# Patient Record
Sex: FEMALE | Race: WHITE | NOT HISPANIC OR LATINO | Employment: OTHER | URBAN - NONMETROPOLITAN AREA
[De-identification: names, ages, dates, MRNs, and addresses within clinical notes are randomized per-mention and may not be internally consistent; named-entity substitution may affect disease eponyms.]

---

## 2019-03-05 ENCOUNTER — TRANSFERRED RECORDS (OUTPATIENT)
Dept: HEALTH INFORMATION MANAGEMENT | Facility: OTHER | Age: 79
End: 2019-03-05

## 2019-04-11 ENCOUNTER — TRANSFERRED RECORDS (OUTPATIENT)
Dept: HEALTH INFORMATION MANAGEMENT | Facility: OTHER | Age: 79
End: 2019-04-11

## 2020-03-05 ENCOUNTER — TRANSFERRED RECORDS (OUTPATIENT)
Dept: HEALTH INFORMATION MANAGEMENT | Facility: OTHER | Age: 80
End: 2020-03-05

## 2020-03-20 ENCOUNTER — TRANSFERRED RECORDS (OUTPATIENT)
Dept: HEALTH INFORMATION MANAGEMENT | Facility: OTHER | Age: 80
End: 2020-03-20

## 2020-04-02 ENCOUNTER — TRANSFERRED RECORDS (OUTPATIENT)
Dept: HEALTH INFORMATION MANAGEMENT | Facility: OTHER | Age: 80
End: 2020-04-02

## 2020-06-03 ENCOUNTER — OFFICE VISIT (OUTPATIENT)
Dept: FAMILY MEDICINE | Facility: OTHER | Age: 80
End: 2020-06-03
Attending: FAMILY MEDICINE
Payer: COMMERCIAL

## 2020-06-03 VITALS
SYSTOLIC BLOOD PRESSURE: 136 MMHG | BODY MASS INDEX: 24.91 KG/M2 | HEART RATE: 68 BPM | OXYGEN SATURATION: 97 % | DIASTOLIC BLOOD PRESSURE: 82 MMHG | HEIGHT: 66 IN | WEIGHT: 155 LBS | TEMPERATURE: 96.7 F

## 2020-06-03 DIAGNOSIS — N63.10 BREAST MASS, RIGHT: Primary | ICD-10-CM

## 2020-06-03 DIAGNOSIS — N60.19 FIBROCYSTIC BREAST CHANGES, UNSPECIFIED LATERALITY: ICD-10-CM

## 2020-06-03 DIAGNOSIS — E78.2 MIXED HYPERLIPIDEMIA: ICD-10-CM

## 2020-06-03 PROCEDURE — 99203 OFFICE O/P NEW LOW 30 MIN: CPT | Performed by: FAMILY MEDICINE

## 2020-06-03 PROCEDURE — G0463 HOSPITAL OUTPT CLINIC VISIT: HCPCS

## 2020-06-03 RX ORDER — METOPROLOL SUCCINATE 25 MG/1
25 TABLET, EXTENDED RELEASE ORAL DAILY
COMMUNITY
End: 2020-06-03

## 2020-06-03 RX ORDER — ACETAMINOPHEN 500 MG
500-1000 TABLET ORAL EVERY 6 HOURS PRN
COMMUNITY

## 2020-06-03 RX ORDER — ROSUVASTATIN CALCIUM 10 MG/1
10 TABLET, COATED ORAL
COMMUNITY
Start: 2019-07-29

## 2020-06-03 RX ORDER — METOPROLOL TARTRATE 25 MG/1
25 TABLET, FILM COATED ORAL DAILY
COMMUNITY

## 2020-06-03 RX ORDER — IBUPROFEN 800 MG/1
800 TABLET, FILM COATED ORAL
COMMUNITY
Start: 2019-07-29

## 2020-06-03 RX ORDER — MULTIVIT,IRON,MINERALS/LUTEIN
TABLET ORAL DAILY
COMMUNITY

## 2020-06-03 ASSESSMENT — PAIN SCALES - GENERAL: PAINLEVEL: NO PAIN (0)

## 2020-06-03 ASSESSMENT — MIFFLIN-ST. JEOR: SCORE: 1195.86

## 2020-06-03 NOTE — PROGRESS NOTES
"Nursing Notes:   Sahlini Yee LPN  6/3/2020  1:08 PM  Signed  Chief Complaint   Patient presents with     Mass     right breast lump noticed March 5 th   She saw a Dr in [place] and she felt a lump in her right breast. She is here to get it looked at.  Shalini Yee LPN..................6/3/2020   1:08 PM      Initial /82   Pulse 68   Temp 96.7  F (35.9  C) (Temporal)   Ht 1.67 m (5' 5.75\")   Wt 70.3 kg (155 lb)   SpO2 97%   Breastfeeding No   BMI 25.21 kg/m   Estimated body mass index is 25.21 kg/m  as calculated from the following:    Height as of this encounter: 1.67 m (5' 5.75\").    Weight as of this encounter: 70.3 kg (155 lb).  Medication Reconciliation: complete    Shalini Yee LPN      SUBJECTIVE:  Louise Mauricio  is a 80 year old female who comes in today because of a lump in her right breast.  This was apparently detected by physician (Dr. Garcia at [facility] in [place]) in [place] on March 5.  She had some tenderness. She had a mammogram and ultrasound. It was recommended she see a specialist. She doesn't feel it has changed in the last 3 months.     Her  was diagnosed with pancreatic cancer, had a Whipple and started chemotherapy.  He is at infusion now.  He is having a rough time with chemotherapy.  He is having markers drawn and will have a PET CT done soon. He is seeing Dr. Chand.    She is never been here before.  They have a place in [place]. They previously lived there, but now have a new place.     They began living in [place], [place] in 2003.     She is on Prilosec for reflux. She is on Crestor for elevated cholesterol.  She had a cardiac nuclear medicine test with cardiology at age 77. She passed the test. She has a strong family history of heart disease and she is the only one who hasn't had any.  She is on low dose Metoprolol 25 mg once daily.         Past Medical, Family, and Social History reviewed and updated as noted below.   ROS is negative " "except as noted above       Allergies not on file, History reviewed. No pertinent family history.,   Current Outpatient Medications   Medication     acetaminophen (TYLENOL) 500 MG tablet     Aspirin-Caffeine (MARGARITA BACK & BODY PO)     ibuprofen (ADVIL/MOTRIN) 800 MG tablet     metoprolol tartrate (LOPRESSOR) 25 MG tablet     Multiple Minerals-Vitamins (CALCIUM-MAGNESIUM-ZINC-D3) TABS     omeprazole (PRILOSEC) 20 MG DR capsule     rosuvastatin (CRESTOR) 10 MG tablet     No current facility-administered medications for this visit.    , History reviewed. No pertinent past medical history., There are no active problems to display for this patient.  ,   Past Surgical History:   Procedure Laterality Date     AS REPAIR OF JAQUIE,ONE  1987     TONSILLECTOMY  age 9    and   Social History     Tobacco Use     Smoking status: Never Smoker     Smokeless tobacco: Never Used   Substance Use Topics     Alcohol use: Yes     Comment: 1-2 drinks a day     OBJECTIVE:  /82   Pulse 68   Temp 96.7  F (35.9  C) (Temporal)   Ht 1.67 m (5' 5.75\")   Wt 70.3 kg (155 lb)   SpO2 97%   Breastfeeding No   BMI 25.21 kg/m     EXAM:  General Appearance: Pleasant, alert, appropriate appearance for age. No acute distress  Head Exam: Normal. Normocephalic, atraumatic.  Eye Exam: PERRLA, EOMI, conjunctivae, sclerae normal.  Nose Exam: Normal external nose, mucus membranes, and septum.  OroPharynx Exam:  Dental hygiene adequate. Normal buccal mucosa. Normal pharynx.  Neck Exam:  Supple, no masses or nodes. No bruits  Thyroid Exam: No nodules or enlargement.  Chest/Respiratory Exam: Normal chest wall and respirations. Clear to auscultation.  Breast Exam: No dimpling, nipple retraction or discharge. No worrisome masses or nodes.  Upper outer quadrant of the right breast has a fullness and what feels like fibrous tissue is maybe 2 x 2 cm that slightly tender.  Cardiovascular Exam: Regular rate and rhythm. S1, S2, no murmur, click, gallop, or " rubs.  Gastrointestinal Exam: Soft, non-tender, no masses or organomegaly..  Lymphatic Exam: Non-palpable nodes in neck,clavicular, axillary, or inguinal regions.   Musculoskeletal Exam: Back is straight and non-tender, full ROM of upper and lower extremities.  Foot Exam: Left and right foot: good pedal pulses  Skin: no rash or abnormalities  Neurologic Exam:  normal gross motor, tone coordination and no tremor.  Psychiatric Exam: Alert and oriented - appropriate affect.   ASSESSMENT/Plan :    Louise was seen today for mass.    Diagnoses and all orders for this visit:    Breast mass, right  -     BREAST CENTER REFERRAL    Fibrocystic breast changes, unspecified laterality  -     BREAST CENTER REFERRAL    Mixed hyperlipidemia      Will review her outside records.  Referral made to the breast center.  Patient was a bit reluctant to go but would agree to it after I review the outside records.  She has fibrocystic breast tissue and I suspect this is probably more fibrocystic lump than anything worrisome but certainly feel we need to follow through with review and possible ongoing follow-up as appropriate.    A total of 35 minutes was spent with the patient, greater than 50% of the time was spent in counseling/discussion of the aforementioned concerns.     Maninedr Lainez MD

## 2020-06-03 NOTE — NURSING NOTE
"Chief Complaint   Patient presents with     Mass     right breast lump noticed March 5 th   She saw a Dr in [place] and she felt a lump in her right breast. She is here to get it looked at.  Shalini Yee LPN..................6/3/2020   1:08 PM      Initial /82   Pulse 68   Temp 96.7  F (35.9  C) (Temporal)   Ht 1.67 m (5' 5.75\")   Wt 70.3 kg (155 lb)   SpO2 97%   Breastfeeding No   BMI 25.21 kg/m   Estimated body mass index is 25.21 kg/m  as calculated from the following:    Height as of this encounter: 1.67 m (5' 5.75\").    Weight as of this encounter: 70.3 kg (155 lb).  Medication Reconciliation: complete    Shalini Yee LPN  "

## 2020-06-10 ENCOUNTER — OFFICE VISIT (OUTPATIENT)
Dept: SURGERY | Facility: OTHER | Age: 80
End: 2020-06-10
Attending: SURGERY
Payer: COMMERCIAL

## 2020-06-10 VITALS
BODY MASS INDEX: 25.23 KG/M2 | SYSTOLIC BLOOD PRESSURE: 120 MMHG | DIASTOLIC BLOOD PRESSURE: 60 MMHG | WEIGHT: 157 LBS | TEMPERATURE: 98.8 F | HEIGHT: 66 IN | RESPIRATION RATE: 16 BRPM | HEART RATE: 66 BPM

## 2020-06-10 DIAGNOSIS — M25.511 CHRONIC RIGHT SHOULDER PAIN: ICD-10-CM

## 2020-06-10 DIAGNOSIS — G89.29 CHRONIC RIGHT SHOULDER PAIN: ICD-10-CM

## 2020-06-10 DIAGNOSIS — R07.89 RIGHT-SIDED CHEST WALL PAIN: Primary | ICD-10-CM

## 2020-06-10 PROCEDURE — 99203 OFFICE O/P NEW LOW 30 MIN: CPT | Performed by: SURGERY

## 2020-06-10 PROCEDURE — G0463 HOSPITAL OUTPT CLINIC VISIT: HCPCS

## 2020-06-10 ASSESSMENT — PAIN SCALES - GENERAL: PAINLEVEL: NO PAIN (0)

## 2020-06-10 ASSESSMENT — MIFFLIN-ST. JEOR: SCORE: 1204.93

## 2020-06-10 NOTE — PROGRESS NOTES
OFFICE CONSULTATION NOTE  Patient Name: Louise Mauricio  Address: [address] [address]  Age:80 year old  Sex: female     Primary Care Physician: Physician No Ref-Primary    I was requested to see this patient in consultation by Dr. Lainez for evaluation of right breast pain and lump. A copy of this note will be sent to Dr. Lainez.    HPI:   The patient is 80 year old female with tenderness in her right breast on the side. She has had this for months. She originally noted it in [place] this winter. She had her yearly exam and her provider noted some thickening of the right outer breast in the area of tenderness. US and mammogram were reportedly normal. The pain is tenderness with palpation. The patient hasn't felt a lump at all. She has no nipple changes or nipple drainage bilaterally. She has not noted any new skin lesions on the breasts. She hasn't noted any new lumps, bumps or tenderness of the left breast.   She notes that she was seeing physical therapy for right shoulder pain and right arm pain previously. She noted that the therapy helped. She didn't have any therapy over the winter in [place]. She has been really busy with caring for her  has he was diagnosed with pancreatic cancer and had surgery in January, he continues with chemotherapy here.   No family history of breast cancer.   The patient hasn't had biopsy performed.       CONSULTATION ASSESSMENT AND PLAN/RECOMMENDATIONS:   I discussed with the patient the pathophysiology of breast pain and breast disease. I suspect this pain is related to chest wall muscles and not breast tissue. We specifically discussed that most pain is not related to breast cancer. I ordered physical therapy referral. I will review the mammogram and US images from [place] when they are available. I will call the patient with that information. If no concerns with the images, patient to return to annual screening mammograms. The patient's questions were answered.The  patient expressed understanding. She will call with questions or concerns..    REVIEW OF SYSTEMS  GENERAL: No fevers or chills. Denies fatigue, recent weight loss. Has some increased stress dealing with her husbands illness.  HEENT: No sinus drainage. No changes with vision or hearing. No difficulty swallowing.   LYMPHATICS:  No swollen nodes in axilla, neck or groin.  CARDIOVASCULAR: Denies chest pain, palpitations and dyspnea on exertion.  PULMONARY: No shortness of breath or cough. No increase in sputum production.  GI: Denies melena, bright red blood in stools. No hematemesis. No constipation or diarrhea.  : No dysuria or hematuria.  SKIN: No recent rashes or ulcers.   HEMATOLOGY:  No history of easy bruising or bleeding.  ENDOCRINE:  No history of diabetes or thyroid problems.  NEUROLOGY:  No history of seizures or headaches. No motor or sensory changes.  BREAST: as above    No past medical history on file.   Past Surgical History:   Procedure Laterality Date     AS REPAIR OF HAMMERTOE,ONE  1987     TONSILLECTOMY  age 9     Current Outpatient Medications   Medication     acetaminophen (TYLENOL) 500 MG tablet     Aspirin-Caffeine (MARGARITA BACK & BODY PO)     ibuprofen (ADVIL/MOTRIN) 800 MG tablet     metoprolol tartrate (LOPRESSOR) 25 MG tablet     Multiple Minerals-Vitamins (CALCIUM-MAGNESIUM-ZINC-D3) TABS     omeprazole (PRILOSEC) 20 MG DR capsule     rosuvastatin (CRESTOR) 10 MG tablet     No current facility-administered medications for this visit.      No Known Allergies  No family history on file.  Social History     Socioeconomic History     Marital status:      Spouse name: None     Number of children: None     Years of education: None     Highest education level: None   Occupational History     None   Social Needs     Financial resource strain: None     Food insecurity     Worry: None     Inability: None     Transportation needs     Medical: None     Non-medical: None   Tobacco Use     Smoking  "status: Never Smoker     Smokeless tobacco: Never Used   Substance and Sexual Activity     Alcohol use: Yes     Comment: 1-2 drinks a day     Drug use: Never     Sexual activity: Not Currently     Partners: Male   Lifestyle     Physical activity     Days per week: None     Minutes per session: None     Stress: None   Relationships     Social connections     Talks on phone: None     Gets together: None     Attends [employer] service: None     Active member of club or organization: None     Attends meetings of clubs or organizations: None     Relationship status: None     Intimate partner violence     Fear of current or ex partner: None     Emotionally abused: None     Physically abused: None     Forced sexual activity: None   Other Topics Concern     None   Social History Narrative    Grew up in [place].  Her  is from [place].        They have been  16 years.  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         In , they bought land [place] [facility] and built a cabin [place]. Her older son now lives there.        Her son Alex  at age 20 with renal carcinoma.      The above history was reviewed and updated today, 6/10/2020    PHYSICAL EXAM  Vitals: /60 (BP Location: Right arm, Patient Position: Sitting, Cuff Size: Adult Regular)   Pulse 66   Temp 98.8  F (37.1  C) (Tympanic)   Resp 16   Ht 1.67 m (5' 5.75\")   Wt 71.2 kg (157 lb)   BMI 25.53 kg/m    GENERAL: Healthy appearing patient in no acute distress. Pleasant and cooperative with exam and interview.   HEENT: Head-normocephalic. Eyes-no scleral icterus, pupils equal, round, andreactive to light. Nose-no nasal drainage. No lesions. Mouth-oral mucosa pink and moist, no lesions.  NECK: Supple. No thyroid nodules. Trachea midline.  LYMPHATICS:  No cervical, axillary or " supraclavicularadenopathy.  CV: Regular rate and rhythm, no murmurs. No peripheral edema.  LUNGS:  No respiratory distress. Clear bilaterally to auscultation.  SKIN: Pink, warm and dry. No jaundice. No rash.  NEURO:  Cranial nerves II-XII grossly intact. Alert and oriented.  PSYCH: Appropriate mood and affect.  BREAST: Breasts were examined in the seated and supine position. No mass noted bilaterally. No nipple changes or discharge bilaterally. Moderate tenderness noted with palpation of the chest wall musculature lateral to the breast tissue.     IMAGING/LAB  Awaiting mammogram and US images. Reports reviewed.

## 2020-06-10 NOTE — NURSING NOTE
"Chief Complaint   Patient presents with     Consult     right breast       Initial /60 (BP Location: Right arm, Patient Position: Sitting, Cuff Size: Adult Regular)   Pulse 66   Temp 98.8  F (37.1  C) (Tympanic)   Resp 16   Ht 1.67 m (5' 5.75\")   Wt 71.2 kg (157 lb)   BMI 25.53 kg/m   Estimated body mass index is 25.53 kg/m  as calculated from the following:    Height as of this encounter: 1.67 m (5' 5.75\").    Weight as of this encounter: 71.2 kg (157 lb).  Medication Reconciliation: complete    At what age did you start menopause? 52  How many children do you have? 3  What age did your menstrual cycle start? 13  How old were you when your first child was born? 21  Did you breast feed? no  Are you on or have you ever taken any hormone replacement or birth control? Yes, both  Do you have a family history of breast cancer? no  Rosa Art LPN..........6/10/2020  12:54 PM        "

## 2020-06-10 NOTE — PATIENT INSTRUCTIONS
Right chest wall pain that I suspect is musculoskeletal in nature. Will discuss with Dr. Lainez. Physical therapy referral ordered for [facility] in [place] per your request. I will call you after we get the images from [place] and I look at them.

## 2020-06-30 ENCOUNTER — TRANSFERRED RECORDS (OUTPATIENT)
Dept: HEALTH INFORMATION MANAGEMENT | Facility: OTHER | Age: 80
End: 2020-06-30

## 2020-07-16 ENCOUNTER — TELEPHONE (OUTPATIENT)
Dept: SURGERY | Facility: OTHER | Age: 80
End: 2020-07-16

## 2020-07-16 NOTE — TELEPHONE ENCOUNTER
Called patient. She is working with physical therapy and the pain is much improved. We received her mammogram images and I reviewed those with the radiologist-no concerning findings. Patient will let up know if she has any further concerns. She denies questions at this time.

## 2020-07-30 ENCOUNTER — OFFICE VISIT (OUTPATIENT)
Dept: FAMILY MEDICINE | Facility: OTHER | Age: 80
End: 2020-07-30
Attending: FAMILY MEDICINE
Payer: COMMERCIAL

## 2020-07-30 VITALS
DIASTOLIC BLOOD PRESSURE: 64 MMHG | SYSTOLIC BLOOD PRESSURE: 120 MMHG | TEMPERATURE: 98.6 F | WEIGHT: 154 LBS | OXYGEN SATURATION: 99 % | HEART RATE: 72 BPM | RESPIRATION RATE: 16 BRPM | BODY MASS INDEX: 25.05 KG/M2

## 2020-07-30 DIAGNOSIS — M51.369 DDD (DEGENERATIVE DISC DISEASE), LUMBAR: Primary | ICD-10-CM

## 2020-07-30 DIAGNOSIS — R35.0 URINARY FREQUENCY: ICD-10-CM

## 2020-07-30 LAB
ALBUMIN UR-MCNC: NEGATIVE MG/DL
APPEARANCE UR: CLEAR
BILIRUB UR QL STRIP: NEGATIVE
COLOR UR AUTO: NORMAL
GLUCOSE UR STRIP-MCNC: NEGATIVE MG/DL
HGB UR QL STRIP: NEGATIVE
KETONES UR STRIP-MCNC: NEGATIVE MG/DL
LEUKOCYTE ESTERASE UR QL STRIP: NEGATIVE
NITRATE UR QL: NEGATIVE
PH UR STRIP: 5 PH (ref 5–7)
SOURCE: NORMAL
SP GR UR STRIP: 1.01 (ref 1–1.03)
UROBILINOGEN UR STRIP-MCNC: NORMAL MG/DL (ref 0–2)

## 2020-07-30 PROCEDURE — 81003 URINALYSIS AUTO W/O SCOPE: CPT | Mod: ZL | Performed by: FAMILY MEDICINE

## 2020-07-30 PROCEDURE — G0463 HOSPITAL OUTPT CLINIC VISIT: HCPCS

## 2020-07-30 PROCEDURE — 99213 OFFICE O/P EST LOW 20 MIN: CPT | Performed by: FAMILY MEDICINE

## 2020-07-30 ASSESSMENT — ENCOUNTER SYMPTOMS
FLANK PAIN: 0
FEVER: 0
FATIGUE: 1
BACK PAIN: 1
DYSURIA: 0
WEAKNESS: 0

## 2020-07-30 ASSESSMENT — PATIENT HEALTH QUESTIONNAIRE - PHQ9: 5. POOR APPETITE OR OVEREATING: NOT AT ALL

## 2020-07-30 ASSESSMENT — ANXIETY QUESTIONNAIRES
5. BEING SO RESTLESS THAT IT IS HARD TO SIT STILL: NOT AT ALL
6. BECOMING EASILY ANNOYED OR IRRITABLE: NOT AT ALL
GAD7 TOTAL SCORE: 0
1. FEELING NERVOUS, ANXIOUS, OR ON EDGE: NOT AT ALL
IF YOU CHECKED OFF ANY PROBLEMS ON THIS QUESTIONNAIRE, HOW DIFFICULT HAVE THESE PROBLEMS MADE IT FOR YOU TO DO YOUR WORK, TAKE CARE OF THINGS AT HOME, OR GET ALONG WITH OTHER PEOPLE: NOT DIFFICULT AT ALL
7. FEELING AFRAID AS IF SOMETHING AWFUL MIGHT HAPPEN: NOT AT ALL
3. WORRYING TOO MUCH ABOUT DIFFERENT THINGS: NOT AT ALL
2. NOT BEING ABLE TO STOP OR CONTROL WORRYING: NOT AT ALL

## 2020-07-30 NOTE — NURSING NOTE
"Coming in with frequency, back pain that started this week- UTI    Chief Complaint   Patient presents with     UTI     back pain, frequency, this week       Initial /64   Pulse 72   Temp 98.6  F (37  C)   Resp 16   Wt 69.9 kg (154 lb)   SpO2 99%   BMI 25.05 kg/m   Estimated body mass index is 25.05 kg/m  as calculated from the following:    Height as of 6/10/20: 1.67 m (5' 5.75\").    Weight as of this encounter: 69.9 kg (154 lb).  Medication Reconciliation: complete    Lorie Damian LPN  "

## 2020-07-30 NOTE — PROGRESS NOTES
SUBJECTIVE:   Louise Mauricio is a 80 year old female who presents to clinic today for the following health issues:    HPI  Worried about a kidney infection.  Back pain is moderate, and worried overall, caring for  who has pancreatic cancer.  Bandlike pain at /3 or so, bilateral.  Noting urgency and frequency.  Some urine leaking only with lifting heavy objects.  Severe back pain in the past, for the last 2 years it has been much more tolerable.  Known stenosis.  L2/3, last RI was 1-2 years ago.    Patient Active Problem List    Diagnosis Date Noted     Mixed hyperlipidemia 06/03/2020     Priority: Medium     Fibrocystic breast changes, unspecified laterality 06/03/2020     Priority: Medium     Closed fracture of metatarsal bone 04/12/2007     Priority: Medium     Past Surgical History:   Procedure Laterality Date     AS REPAIR OF GIULIANOMYLESQIAN,ONE  1987     TONSILLECTOMY  age 9     Social History     Tobacco Use     Smoking status: Never Smoker     Smokeless tobacco: Never Used   Substance Use Topics     Alcohol use: Yes     Comment: 1-2 drinks a day     Current Outpatient Medications   Medication Sig Dispense Refill     acetaminophen (TYLENOL) 500 MG tablet Take 500-1,000 mg by mouth every 6 hours as needed for mild pain       Aspirin-Caffeine (MARGARITA BACK & BODY PO) Take 1,000 mg by mouth daily       ibuprofen (ADVIL/MOTRIN) 800 MG tablet Take 800 mg by mouth       metoprolol tartrate (LOPRESSOR) 25 MG tablet Take 25 mg by mouth daily       Multiple Minerals-Vitamins (CALCIUM-MAGNESIUM-ZINC-D3) TABS Take by mouth daily       omeprazole (PRILOSEC) 20 MG DR capsule Take 20 mg by mouth       rosuvastatin (CRESTOR) 10 MG tablet Take 10 mg by mouth       No Known Allergies    Review of Systems   Constitutional: Positive for fatigue. Negative for fever.   Genitourinary: Negative for decreased urine volume, dysuria and flank pain.   Musculoskeletal: Positive for back pain.   Neurological: Negative for weakness.         OBJECTIVE:     /64   Pulse 72   Temp 98.6  F (37  C)   Resp 16   Wt 69.9 kg (154 lb)   SpO2 99%   BMI 25.05 kg/m    Body mass index is 25.05 kg/m .  Physical Exam  Constitutional:       Appearance: Normal appearance.   Musculoskeletal:      Comments: No flank pain on percussion,  No midline lumbar pain and negative straight leg raise.    Neurological:      General: No focal deficit present.      Mental Status: She is alert and oriented to person, place, and time.   Psychiatric:         Mood and Affect: Mood normal.         Behavior: Behavior normal.         Thought Content: Thought content normal.         Diagnostic Test Results:  Results for orders placed or performed in visit on 07/30/20 (from the past 24 hour(s))   UA reflex to Microscopic   Result Value Ref Range    Color Urine Light Yellow     Appearance Urine Clear     Glucose Urine Negative NEG^Negative mg/dL    Bilirubin Urine Negative NEG^Negative    Ketones Urine Negative NEG^Negative mg/dL    Specific Gravity Urine 1.012 1.003 - 1.035    Blood Urine Negative NEG^Negative    pH Urine 5.0 5.0 - 7.0 pH    Protein Albumin Urine Negative NEG^Negative mg/dL    Urobilinogen mg/dL Normal 0.0 - 2.0 mg/dL    Nitrite Urine Negative NEG^Negative    Leukocyte Esterase Urine Negative NEG^Negative    Source Midstream Urine        ASSESSMENT/PLAN:         (M51.36) DDD (degenerative disc disease), lumbar  (primary encounter diagnosis)  Comment: stable, I offered an x-ray to rule out compression fracture or cancer and she declines  Plan: follow up as needed     (R35.0) Urinary frequency  Comment: using some vitamin supplements recently and perhaps this is playing a role.  Doubt a UTI with a normal UA.    Plan: UA reflex to Microscopic                 Milo Mcmahan MD  [facility] AND [facility]

## 2020-07-31 ASSESSMENT — ANXIETY QUESTIONNAIRES: GAD7 TOTAL SCORE: 0

## 2020-08-07 ENCOUNTER — TELEPHONE (OUTPATIENT)
Dept: FAMILY MEDICINE | Facility: OTHER | Age: 80
End: 2020-08-07

## 2020-08-07 ENCOUNTER — ALLIED HEALTH/NURSE VISIT (OUTPATIENT)
Dept: FAMILY MEDICINE | Facility: OTHER | Age: 80
End: 2020-08-07
Attending: FAMILY MEDICINE
Payer: COMMERCIAL

## 2020-08-07 DIAGNOSIS — R43.0 SENSE OF SMELL LOST: Primary | ICD-10-CM

## 2020-08-07 DIAGNOSIS — R43.0 SENSE OF SMELL LOST: ICD-10-CM

## 2020-08-07 PROCEDURE — C9803 HOPD COVID-19 SPEC COLLECT: HCPCS

## 2020-08-07 PROCEDURE — 99207 ZZC NO CHARGE NURSE ONLY: CPT

## 2020-08-07 PROCEDURE — U0003 INFECTIOUS AGENT DETECTION BY NUCLEIC ACID (DNA OR RNA); SEVERE ACUTE RESPIRATORY SYNDROME CORONAVIRUS 2 (SARS-COV-2) (CORONAVIRUS DISEASE [COVID-19]), AMPLIFIED PROBE TECHNIQUE, MAKING USE OF HIGH THROUGHPUT TECHNOLOGIES AS DESCRIBED BY CMS-2020-01-R: HCPCS | Mod: ZL | Performed by: FAMILY MEDICINE

## 2020-08-07 NOTE — TELEPHONE ENCOUNTER
Let her know orders placed, give instructions on how to get swabbing done.  Milo Mcmahan MD on 8/7/2020 at 8:55 AM

## 2020-08-07 NOTE — TELEPHONE ENCOUNTER
TJP-Pt states you recommended a covid test for her but I am not seeing any orders. She said you told her she could just come in. Please call. Thank you.  Opal Viveros

## 2020-08-07 NOTE — TELEPHONE ENCOUNTER
Calling regarding a covid test, she was in with her  yesterday  Lost her sense of smell  No temp  Really tired     Lorie Damian LPN on 8/7/2020 at 8:13 AM

## 2020-08-10 ENCOUNTER — TELEPHONE (OUTPATIENT)
Dept: NURSING | Facility: CLINIC | Age: 80
End: 2020-08-10

## 2020-08-10 LAB
SARS-COV-2 RNA SPEC QL NAA+PROBE: ABNORMAL
SPECIMEN SOURCE: ABNORMAL

## 2020-08-11 ENCOUNTER — TELEPHONE (OUTPATIENT)
Dept: FAMILY MEDICINE | Facility: OTHER | Age: 80
End: 2020-08-11

## 2020-08-11 NOTE — TELEPHONE ENCOUNTER
"Coronavirus (COVID-19) Notification    Caller Name (Patient, parent, daughter/son, grandparent, etc)  Patient: Louise Mauricio    Reason for call  Notify of Positive Coronavirus (COVID-19) lab results, assess symptoms,  review  [facility] recommendations    Lab Result    Lab test:  2019-nCoV rRt-PCR or SARS-CoV-2 PCR    Oropharyngeal AND/OR nasopharyngeal swabs is POSITIVE for 2019-nCoV RNA/SARS-COV-2 PCR (COVID-19 virus)    RN Recommendations/Instructions per [facility] Coronavirus COVID-19 recommendations    Brief introduction script  Introduce self then review script:  \"I am calling on behalf of [employer].  We were notified that your Coronavirus test (COVID-19) for was POSITIVE for the virus.  I have some information to relay to you but first I wanted to mention that the [place] Dept of Health will be contacting you shortly [it's possible MD already called Patient] to talk to you more about how you are feeling and other people you have had contact with who might now also have the virus.  Also,  [employer] is Partnering with the [facility] for Covid-19 research, you may be contacted directly by research staff.\"    Assessment (Inquire about Patient's current symptoms)   Assessment   Current Symptoms at time of phone call: (if no symptoms, document No symptoms] \"Just little cough\".    Symptoms onset (if applicable) 7/26/2020     If at time of call, Patients symptoms hare worsened, the Patient should contact 911 or have someone drive them to Emergency Dept promptly:      If Patient calling 911, inform 911 personal that you have tested positive for the Coronavirus (COVID-19).  Place mask on and await 911 to arrive.    If Emergency Dept, If possible, please have another adult drive you to the Emergency Dept but you need to wear mask when in contact with other people.      Review information with Patient    Your result was positive. This means you have COVID-19 (coronavirus).  We have sent you a " letter that reviews the information that I'll be reviewing with you now.    How can I protect others?    If you have symptoms: stay home and away from others (self-isolate) until:    You've had no fever--and no medicine that reduces fever--for 3 full days (72 hours). And      Your other symptoms have gotten better. For example, your cough or breathing has improved. And     At least 10 days have passed since your symptoms started.    If you don't have symptoms: Stay home and away from others (self-isolate) until at least 10 days have passed since your first positive COVID-19 test. (Date test collected)    During this time:    Stay in your own room, including for meals. Use your own bathroom if you can.    Stay away from others in your home. No hugging, kissing or shaking hands. No visitors.     Don't go to work, school or anywhere else.     Clean  high touch  surfaces often (doorknobs, counters, handles, etc.). Use a household cleaning spray or wipes. You'll find a full list on the EPA website at www.epa.gov/pesticide-registration/list-n-disinfectants-use-against-sars-cov-2.     Cover your mouth and nose with a mask, tissue or washcloth to avoid spreading germs.    Wash your hands and face often with soap and water.    Caregivers in these groups are at risk for severe illness due to COVID-19:  o People 65 years and older  o People who live in a nursing home or long-term care facility  o People with chronic disease (lung, heart, cancer, diabetes, kidney, liver, immunologic)  o People who have a weakened immune system, including those who:  - Are in cancer treatment  - Take medicine that weakens the immune system, such as corticosteroids  - Had a bone marrow or organ transplant  - Have an immune deficiency  - Have poorly controlled HIV or AIDS  - Are obese (body mass index of 40 or higher)  - Smoke regularly    Caregivers should wear gloves while washing dishes, handling laundry and cleaning bedrooms and  bathrooms.    Wash and dry laundry with special caution. Don't shake dirty laundry, and use the warmest water setting you can.    If you have a weakened immune system, ask your doctor about other actions you should take.    For more tips, go to www.cdc.gov/coronavirus/2019-ncov/downloads/10Things.pdf.    You should not go back to work until you meet the guidelines above for ending your home isolation. You should meet these along with any other guidelines that your employer has.    Employers: This document serves as formal notice of your employee's medical guidelines for going back to work. They must meet the above guidelines before going back to work in person.    How can I take care of myself?    1. Get lots of rest. Drink extra fluids (unless a doctor has told you not to).    2. Take Tylenol (acetaminophen) for fever or pain. If you have liver or kidney problems, ask your family doctor if it's okay to take Tylenol.     Take either:     650 mg (two 325 mg pills) every 4 to 6 hours, or     1,000 mg (two 500 mg pills) every 8 hours as needed.     Note: Don't take more than 3,000 mg in one day. Acetaminophen is found in many medicines (both prescribed and over-the-counter medicines). Read all labels to be sure you don't take too much.    For children, check the Tylenol bottle for the right dose (based on their age or weight).    3. If you have other health problems (like cancer, heart failure, an organ transplant or severe kidney disease): Call your specialty clinic if you don't feel better in the next 2 days.    4. Know when to call 911: Emergency warning signs include:    Trouble breathing or shortness of breath    Pain or pressure in the chest that doesn't go away    Feeling confused like you haven't felt before, or not being able to wake up    Bluish-colored lips or face    5. Sign up for [employer]. We know it's scary to hear that you have COVID-19. We want to track your symptoms to make sure you're okay over  the next 2 weeks. Please look for an email from [employer]--this is a free, online program that we'll use to keep in touch. To sign up, follow the link in the email. Learn more at [email or web address].    Where can I get more information?    [facility]: [email or web address]    Coronavirus Basics: www.health.[facility].[place]./diseases/coronavirus/basics.html    What to Do If You're Sick: www.cdc.gov/coronavirus/2019-ncov/about/steps-when-sick.html    Ending Home Isolation: www.cdc.gov/coronavirus/2019-ncov/hcp/disposition-in-home-patients.html     Caring for Someone with COVID-19: www.cdc.gov/coronavirus/2019-ncov/if-you-are-sick/care-for-someone.html     [facility] clinical trials (COVID-19 research studies): clinicalaffairs.[facility] [place]/[facility]-clinical-trials     A Positive COVID-19 letter will be sent via [employer] or the mail.    [Name]  Walter Sales RN  [facility]

## 2020-08-18 ENCOUNTER — TELEPHONE (OUTPATIENT)
Dept: FAMILY MEDICINE | Facility: OTHER | Age: 80
End: 2020-08-18

## 2020-08-18 NOTE — TELEPHONE ENCOUNTER
Typically would quarantine for 14 days after the test and then considered clear.  Would not re-test unless she had symptoms at that time.  Maninder Lainez MD on 8/18/2020 at 12:53 PM

## 2020-08-18 NOTE — TELEPHONE ENCOUNTER
After the patient's name and date of birth was verified, the patient was told the below information.  Shalini Yee LPN..................8/18/2020   1:38 PM

## 2021-09-01 ENCOUNTER — HOSPITAL ENCOUNTER (EMERGENCY)
Facility: CLINIC | Age: 81
Discharge: HOME | End: 2021-09-01
Attending: EMERGENCY MEDICINE | Admitting: EMERGENCY MEDICINE
Payer: COMMERCIAL

## 2021-09-01 VITALS
TEMPERATURE: 96.9 F | RESPIRATION RATE: 16 BRPM | WEIGHT: 140 LBS | DIASTOLIC BLOOD PRESSURE: 77 MMHG | OXYGEN SATURATION: 97 % | BODY MASS INDEX: 21.97 KG/M2 | HEART RATE: 68 BPM | SYSTOLIC BLOOD PRESSURE: 163 MMHG | HEIGHT: 67 IN

## 2021-09-01 DIAGNOSIS — R19.7 DIARRHEA, UNSPECIFIED TYPE: ICD-10-CM

## 2021-09-01 LAB
ALBUMIN SERPL-MCNC: 3.7 G/DL (ref 3.4–5)
ALP SERPL-CCNC: 63 U/L (ref 40–150)
ALT SERPL W P-5'-P-CCNC: 22 U/L (ref 0–50)
ANION GAP SERPL CALCULATED.3IONS-SCNC: 5 MMOL/L (ref 3–14)
AST SERPL W P-5'-P-CCNC: 15 U/L (ref 0–45)
BASOPHILS # BLD AUTO: 0 10E3/UL (ref 0–0.2)
BASOPHILS NFR BLD AUTO: 1 %
BILIRUB SERPL-MCNC: 0.3 MG/DL (ref 0.2–1.3)
BUN SERPL-MCNC: 9 MG/DL (ref 7–30)
CALCIUM SERPL-MCNC: 9 MG/DL (ref 8.5–10.1)
CHLORIDE BLD-SCNC: 107 MMOL/L (ref 94–109)
CO2 SERPL-SCNC: 28 MMOL/L (ref 20–32)
CREAT SERPL-MCNC: 0.76 MG/DL (ref 0.52–1.04)
EOSINOPHIL # BLD AUTO: 0.3 10E3/UL (ref 0–0.7)
EOSINOPHIL NFR BLD AUTO: 5 %
ERYTHROCYTE [DISTWIDTH] IN BLOOD BY AUTOMATED COUNT: 13.7 % (ref 10–15)
GFR SERPL CREATININE-BSD FRML MDRD: 75 ML/MIN/1.73M2
GLUCOSE BLD-MCNC: 95 MG/DL (ref 70–99)
HCT VFR BLD AUTO: 38.8 % (ref 35–47)
HGB BLD-MCNC: 12.4 G/DL (ref 11.7–15.7)
IMM GRANULOCYTES # BLD: 0 10E3/UL
IMM GRANULOCYTES NFR BLD: 0 %
LYMPHOCYTES # BLD AUTO: 1.6 10E3/UL (ref 0.8–5.3)
LYMPHOCYTES NFR BLD AUTO: 22 %
MCH RBC QN AUTO: 31.5 PG (ref 26.5–33)
MCHC RBC AUTO-ENTMCNC: 32 G/DL (ref 31.5–36.5)
MCV RBC AUTO: 99 FL (ref 78–100)
MONOCYTES # BLD AUTO: 0.7 10E3/UL (ref 0–1.3)
MONOCYTES NFR BLD AUTO: 10 %
NEUTROPHILS # BLD AUTO: 4.4 10E3/UL (ref 1.6–8.3)
NEUTROPHILS NFR BLD AUTO: 62 %
NRBC # BLD AUTO: 0 10E3/UL
NRBC BLD AUTO-RTO: 0 /100
PLATELET # BLD AUTO: 251 10E3/UL (ref 150–450)
POTASSIUM BLD-SCNC: 3.7 MMOL/L (ref 3.4–5.3)
PROT SERPL-MCNC: 7.1 G/DL (ref 6.8–8.8)
RBC # BLD AUTO: 3.94 10E6/UL (ref 3.8–5.2)
SODIUM SERPL-SCNC: 140 MMOL/L (ref 133–144)
TSH SERPL DL<=0.005 MIU/L-ACNC: 2.42 MU/L (ref 0.4–4)
WBC # BLD AUTO: 7.1 10E3/UL (ref 4–11)

## 2021-09-01 PROCEDURE — 36415 COLL VENOUS BLD VENIPUNCTURE: CPT | Performed by: EMERGENCY MEDICINE

## 2021-09-01 PROCEDURE — 85025 COMPLETE CBC W/AUTO DIFF WBC: CPT | Performed by: EMERGENCY MEDICINE

## 2021-09-01 PROCEDURE — 84443 ASSAY THYROID STIM HORMONE: CPT | Performed by: EMERGENCY MEDICINE

## 2021-09-01 PROCEDURE — 82040 ASSAY OF SERUM ALBUMIN: CPT | Performed by: EMERGENCY MEDICINE

## 2021-09-01 PROCEDURE — 99283 EMERGENCY DEPT VISIT LOW MDM: CPT

## 2021-09-01 RX ORDER — LOPERAMIDE HYDROCHLORIDE 2 MG/1
2-4 TABLET ORAL 4 TIMES DAILY PRN
Qty: 20 TABLET | Refills: 0 | Status: SHIPPED | OUTPATIENT
Start: 2021-09-01

## 2021-09-01 ASSESSMENT — ENCOUNTER SYMPTOMS
DIARRHEA: 1
COLOR CHANGE: 0
SORE THROAT: 0
RHINORRHEA: 0
CHILLS: 0
NAUSEA: 0
BLOOD IN STOOL: 0
COUGH: 0
ABDOMINAL PAIN: 1
VOMITING: 0
FEVER: 0

## 2021-09-01 ASSESSMENT — MIFFLIN-ST. JEOR: SCORE: 1142.67

## 2021-09-01 NOTE — DISCHARGE INSTRUCTIONS
Discharge Instructions  Adult Diarrhea    You have been seen today for diarrhea (loose stools). This is usually caused by a virus, but some bacteria, parasites, medicines, or other medical conditions can cause similar symptoms. At this time your provider does not find that your diarrhea is a sign of anything dangerous or life-threatening. However, sometimes the signs of serious illness do not show up right away. If you have new or worse symptoms, you may need to be seen again in the Emergency Department or by your primary provider.     Generally, every Emergency Department visit should have a follow-up clinic visit with either a primary or a specialty clinic/provider. Please follow-up as instructed by your emergency provider today.    Return to the Emergency Department if:  You feel you are getting dehydrated, such as being very thirsty, not urinating (peeing) like usual, or feeling faint or lightheaded.   You develop a new fever.  You have abdominal (belly) pain that seems worse than cramps, is in one spot, or is getting worse over time.   You have blood in your stool or your stool becomes black.  (Remember that if you take Pepto-Bismol , this will turn your stool black).   You feel very weak.    What can I do to help myself?  The most important thing to do is to drink clear liquids.   It is best to have only small, frequent sips of liquids. Drinking too much at once may cause more diarrhea. You should also replace minerals, sodium and potassium lost with diarrhea. Pedialyte  and sports drinks can help you replace these minerals. You can also drink clear liquids such as water, weak tea, apple juice, and 7-Up . Avoid acidic liquids (orange juice), caffeine (coffee) or alcohol. Milk products will make the diarrhea worse.  Eat bland (plain) foods. Soda crackers, toast, plain noodles, gelatin, applesauce and bananas are good first choices. Avoid foods that have acid, are spicy, fatty or fibrous (such as  "meats, coarse grains, vegetables). You may start eating these foods again in about 3 days when you are better.   Sometimes treatment includes prescription medicine to prevent diarrhea. If your provider prescribes these for you, take them as directed.   Nonprescription medicine is available for the treatment of diarrhea and can be very effective. If you use it, make sure you use the dose recommended on the package. Check with your healthcare provider before you use any medicine for diarrhea.   Do not take ibuprofen, or other nonsteroidal anti-inflammatory medicines, without checking with your healthcare provider.   Probiotics: If you have been given an antibiotic, you may want to also take a probiotic pill or eat yogurt with live cultures. Probiotics have \"good bacteria\" to help your intestines stay healthy. Studies have shown that probiotics help prevent diarrhea and other intestine problems (including C. diff infection) when you take antibiotics. You can buy these without a prescription in the pharmacy section of the store.   If you were given a prescription for medicine here today, be sure to read all of the information (including the package insert) that comes with your prescription.  This will include important information about the medicine, its side effects, and any warnings that you need to know about.  The pharmacist who fills the prescription can provide more information and answer questions you may have about the medicine.  If you have questions or concerns that the pharmacist cannot address, please call or return to the Emergency Department.  Remember that you can always come back to the Emergency Department if you are not able to see your regular provider in the amount of time listed above, if you get any new symptoms, or if there is anything that worries you.    "

## 2021-09-01 NOTE — ED PROVIDER NOTES
History     Chief Complaint:  Diarrhea       HPI   Louise Mauricio is a 81 year old female with history of hypertension who presents with diarrhea, abdominal discomfort which has been ongoing for approximately 4 months and has worsened in the past few weeks. She reports that the episodes of diarrhea occur after she eats. She states that she has as little as 2 and as many as 6 episodes of diarrhea per day. Her symptoms have been managed with imodium. She reports that her diarrhea is occasionally very dark but denies bloody stool. Per triage note, she has additionally had 3 episodes of fecal incontinence in the past week. Louise has been visiting from [place] since June and states that the diarrhea began while she was in [place]. She did see her doctor there but did not return for her testing 24 hours after her visit as her diarrhea had subsided. She denies seeing a regular doctor here in [place]. Louise denies nausea, vomiting, fever, chills, cough, rhinorrhea, sore throat, rash, skin changes. She denies recent camping or concerns for drinking contaminated water. Per triage note, she denies recent antibiotic use; denies chronic bowel disorder.    Review of Systems   Constitutional: Negative for chills and fever.   HENT: Negative for rhinorrhea and sore throat.    Respiratory: Negative for cough.    Gastrointestinal: Positive for abdominal pain and diarrhea. Negative for blood in stool, nausea and vomiting.   Skin: Negative for color change and rash.   All other systems reviewed and are negative.    Allergies:  The patient has no known allergies.     Medications:  Metoprolol  Omeprazole  Crestor  Gabapentin    Past Medical History:    Hypertension  Arthritis  Chronic low back pain    Past Surgical History:    Tonsillectomy  Hammertoe repair, bilateral  Metatarsal fracture repair    Family History:    Mother: stroke  Father: heart failure, lymphoma  Brother: heart failure, lymphoma  Sister: heart failure  Son: kidney  "cancer    Social History:  Patient presents to the ED alone.  Patient does not use drugs or tobacco.  Patient drinks alcohol occasionally.    Physical Exam     Patient Vitals for the past 24 hrs:   BP Temp Temp src Pulse Resp SpO2 Height Weight   21 1220 (!) 163/77 -- -- -- -- 97 % -- --   21 1115 -- -- -- -- -- 96 % -- --   21 1100 -- -- -- -- -- 98 % -- --   21 1045 -- -- -- -- -- 97 % -- --   21 1030 -- -- -- -- -- 100 % -- --   21 0759 127/71 96.9  F (36.1  C) Temporal 68 16 100 % 1.702 m (5' 7\") 63.5 kg (140 lb)       Physical Exam  Constitutional: Well developed, nontox appearance  Head: Atraumatic.   Neck:  no stridor  Eyes: no scleral icterus  Cardiovascular: RRR, 2+ bilat radial pulses  Pulmonary/Chest: nml resp effort  Abdominal: ND, soft, NT, no rebound or guarding   Ext: Warm, well perfused, no edema  Neurological: A&O, symmetric facies, moves ext x4  Skin: Skin is warm and dry.   Psychiatric: Behavior is normal. Thought content normal.   Nursing note and vitals reviewed.    Emergency Department Course     Laboratory:    CBC: WBC 7.1, HGB 12.4,    CMP: all WNL (Creatinine 0.76)     TSH: 2.42    C diff toxin B PCR: pending    Enteric bacteria and virus panel by JOSE ALBERTO stool: pending    Emergency Department Course:    Reviewed:  I reviewed nursing notes, vitals, past medical history, and care everywhere    Assessments:  1015 I obtained history and examined the patient as noted above.   1214 I rechecked the patient and explained findings.     Disposition:  The patient was discharged to home.     Impression & Plan     Medical Decision Makin year old female presenting w/ nonbloody nonmelanotic diarrhea     DDx includes diarrhea NOS, enteritis, colitis, electrolyte abnormality, thyroid dysfunction.  No signs within normal limits and patient appears euvolemic.  Doubt surgical etiology, toxic megacolon given history of physical exam.  Labs significant for no " remarkable abnormality, stool cultures ordered and pending.  Given unlikely nature for surgical etiology, intra-abdominal abscess, diverticulitis, imaging deferred.  Patient unable to give stool sample in ED.  Patient was advised on symptomatic management with loperamide/Imodium and follow-up with her primary doctor when she returns home.  At this time I feel the pt is safe for discharge.  Recommendations given regarding follow up with PCP, gastroenterology as needed when she returns to [place] and return to the emergency department as needed for new or worsening symptoms.  Patient counseled on all results, disposition and diagnosis.  They are understanding and agreeable to plan. Patient discharged in stable condition.      Diagnosis:    ICD-10-CM    1. Diarrhea, unspecified type  R19.7        Discharge Medications:  Discharge Medication List as of 9/1/2021 12:13 PM      START taking these medications    Details   loperamide (IMODIUM A-D) 2 MG tablet Take 1-2 tablets (2-4 mg) by mouth 4 times daily as needed for diarrhea Do not exceed 16 mg per day, Disp-20 tablet, R-0, Local Print             Scribe Disclosure:  I, Ondina Hamilton, am serving as a scribe at 10:07 AM on 9/1/2021 to document services personally performed by Beau Kruse MD based on my observations and the provider's statements to me.       Beau Kruse MD  09/02/21 0101

## 2021-09-01 NOTE — ED TRIAGE NOTES
"Patient reports diarrhea for 4 weeks, worsening over the past 2 weeks up to 5 a day. \"I need to get to the bottom of this because I'm going to be driving to [place] and then [place] and there's not always a place to stop\". About 3 episodes of fecal incontinence in the past week. No recent antibiotic use. Denies chronic bowel disorder. No fevers. No accompanying symptoms.   "

## 2021-09-01 NOTE — ED NOTES
Pt reports unsuccessful stool collection, informed pt of home collection kit if unable to have BM in the ED. Pt comfortable with this plan.

## 2021-09-01 NOTE — ED NOTES
Patient alert and oriented. Respirations even and unlabored. All discharge education given. All questions answered. Educated on home stool collection kit. Patient denies further needs and states that they are ready to leave. Patient ambulated out of the ER with steady gait.